# Patient Record
Sex: FEMALE | URBAN - METROPOLITAN AREA
[De-identification: names, ages, dates, MRNs, and addresses within clinical notes are randomized per-mention and may not be internally consistent; named-entity substitution may affect disease eponyms.]

---

## 2024-03-14 ENCOUNTER — OFFICE (OUTPATIENT)
Dept: URBAN - METROPOLITAN AREA CLINIC 115 | Facility: CLINIC | Age: 54
Setting detail: OPHTHALMOLOGY
End: 2024-03-14
Payer: COMMERCIAL

## 2024-03-14 DIAGNOSIS — H10.89: ICD-10-CM

## 2024-03-14 PROCEDURE — 99202 OFFICE O/P NEW SF 15 MIN: CPT | Performed by: OPTOMETRIST

## 2024-04-04 ENCOUNTER — OFFICE (OUTPATIENT)
Dept: URBAN - METROPOLITAN AREA CLINIC 115 | Facility: CLINIC | Age: 54
Setting detail: OPHTHALMOLOGY
End: 2024-04-04
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
## 2024-04-04 PROCEDURE — NO SHOW FE NO SHOW FEE: Performed by: OPTOMETRIST

## 2024-07-19 ENCOUNTER — EMERGENCY (EMERGENCY)
Facility: HOSPITAL | Age: 54
LOS: 1 days | Discharge: HOME | End: 2024-07-19
Attending: STUDENT IN AN ORGANIZED HEALTH CARE EDUCATION/TRAINING PROGRAM
Payer: SELF-PAY

## 2024-07-19 VITALS
TEMPERATURE: 98 F | HEART RATE: 82 BPM | RESPIRATION RATE: 18 BRPM | OXYGEN SATURATION: 99 % | SYSTOLIC BLOOD PRESSURE: 143 MMHG | DIASTOLIC BLOOD PRESSURE: 88 MMHG

## 2024-07-19 VITALS
TEMPERATURE: 97 F | RESPIRATION RATE: 18 BRPM | OXYGEN SATURATION: 98 % | HEART RATE: 92 BPM | DIASTOLIC BLOOD PRESSURE: 110 MMHG | SYSTOLIC BLOOD PRESSURE: 180 MMHG | WEIGHT: 160.06 LBS

## 2024-07-19 PROBLEM — Z00.00 ENCOUNTER FOR PREVENTIVE HEALTH EXAMINATION: Status: ACTIVE | Noted: 2024-07-19

## 2024-07-19 PROCEDURE — 73502 X-RAY EXAM HIP UNI 2-3 VIEWS: CPT | Mod: 26,RT

## 2024-07-19 PROCEDURE — 73080 X-RAY EXAM OF ELBOW: CPT | Mod: 26,RT

## 2024-07-19 PROCEDURE — 73564 X-RAY EXAM KNEE 4 OR MORE: CPT

## 2024-07-19 PROCEDURE — 73030 X-RAY EXAM OF SHOULDER: CPT | Mod: 26,RT

## 2024-07-19 PROCEDURE — 73080 X-RAY EXAM OF ELBOW: CPT

## 2024-07-19 PROCEDURE — 73502 X-RAY EXAM HIP UNI 2-3 VIEWS: CPT

## 2024-07-19 PROCEDURE — 99284 EMERGENCY DEPT VISIT MOD MDM: CPT

## 2024-07-19 PROCEDURE — 73030 X-RAY EXAM OF SHOULDER: CPT

## 2024-07-19 PROCEDURE — 73110 X-RAY EXAM OF WRIST: CPT

## 2024-07-19 PROCEDURE — 73110 X-RAY EXAM OF WRIST: CPT | Mod: 26,RT

## 2024-07-19 PROCEDURE — 73564 X-RAY EXAM KNEE 4 OR MORE: CPT | Mod: 26,RT

## 2024-07-19 RX ORDER — ACETAMINOPHEN 325 MG
650 TABLET ORAL ONCE
Refills: 0 | Status: COMPLETED | OUTPATIENT
Start: 2024-07-19 | End: 2024-07-19

## 2024-07-19 RX ORDER — NAPROXEN SODIUM 550 MG
1 TABLET ORAL
Qty: 14 | Refills: 0
Start: 2024-07-19 | End: 2024-07-25

## 2024-07-19 RX ADMIN — Medication 10 MILLIGRAM(S): at 11:27

## 2024-07-19 RX ADMIN — Medication 650 MILLIGRAM(S): at 11:25

## 2024-07-19 NOTE — ED PROVIDER NOTE - CARE PROVIDER_API CALL
Timothy Fernandez  Orthopaedic Surgery  [address] [place] [id number]  Phone: [phone]  Fax: [phone]  Follow Up Time:
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
## 2024-07-19 NOTE — ED ADULT NURSE NOTE - NSFALLUNIVINTERV_ED_ALL_ED
Addended by: Wellington Coronado on: 3/18/2021 11:36 AM     Modules accepted: Orders Bed/Stretcher in lowest position, wheels locked, appropriate side rails in place/Call bell, personal items and telephone in reach/Instruct patient to call for assistance before getting out of bed/chair/stretcher/Non-slip footwear applied when patient is off stretcher/[place] to call system/Physically safe environment - no spills, clutter or unnecessary equipment/Purposeful proactive rounding/Room/bathroom lighting operational, light cord in reach
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
## 2024-07-19 NOTE — ED PROVIDER NOTE - PATIENT PORTAL LINK FT
You can access the FollowMyHealth Patient Portal offered by [facility] by registering at the following website: [email or web address]. By joining [employer]s FollowMyHealth portal, you will also be able to view your health information using other applications (apps) compatible with our system.
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
## 2024-07-23 ENCOUNTER — APPOINTMENT (OUTPATIENT)
Dept: ORTHOPEDIC SURGERY | Facility: CLINIC | Age: 54
End: 2024-07-23
Payer: COMMERCIAL

## 2024-07-23 VITALS
BODY MASS INDEX: 31.08 KG/M2 | HEIGHT: 71 IN | HEART RATE: 86 BPM | WEIGHT: 222 LBS | DIASTOLIC BLOOD PRESSURE: 84 MMHG | SYSTOLIC BLOOD PRESSURE: 128 MMHG

## 2024-07-23 DIAGNOSIS — S63.501A UNSPECIFIED SPRAIN OF RIGHT WRIST, INITIAL ENCOUNTER: ICD-10-CM

## 2024-07-23 DIAGNOSIS — Z78.9 OTHER SPECIFIED HEALTH STATUS: ICD-10-CM

## 2024-07-23 DIAGNOSIS — Z86.79 PERSONAL HISTORY OF OTHER DISEASES OF THE CIRCULATORY SYSTEM: ICD-10-CM

## 2024-07-23 DIAGNOSIS — Z82.49 FAMILY HISTORY OF ISCHEMIC HEART DISEASE AND OTHER DISEASES OF THE CIRCULATORY SYSTEM: ICD-10-CM

## 2024-07-23 DIAGNOSIS — M47.22 OTHER SPONDYLOSIS WITH RADICULOPATHY, CERVICAL REGION: ICD-10-CM

## 2024-07-23 DIAGNOSIS — Z80.49 FAMILY HISTORY OF MALIGNANT NEOPLASM OF OTHER GENITAL ORGANS: ICD-10-CM

## 2024-07-23 DIAGNOSIS — S13.4XXA SPRAIN OF LIGAMENTS OF CERVICAL SPINE, INITIAL ENCOUNTER: ICD-10-CM

## 2024-07-23 PROCEDURE — 72050 X-RAY EXAM NECK SPINE 4/5VWS: CPT

## 2024-07-23 PROCEDURE — 99204 OFFICE O/P NEW MOD 45 MIN: CPT

## 2024-07-23 RX ORDER — METHYLPREDNISOLONE 4 MG/1
4 TABLET ORAL
Qty: 1 | Refills: 0 | Status: ACTIVE | COMMUNITY
Start: 2024-07-23 | End: 1900-01-01

## 2024-07-23 RX ORDER — LOSARTAN POTASSIUM 50 MG/1
50 TABLET, FILM COATED ORAL
Refills: 0 | Status: ACTIVE | COMMUNITY

## 2024-07-23 RX ORDER — GABAPENTIN 300 MG/1
300 CAPSULE ORAL
Qty: 30 | Refills: 1 | Status: ACTIVE | COMMUNITY
Start: 2024-07-23 | End: 1900-01-01

## 2024-07-23 NOTE — DISCUSSION/SUMMARY
[de-identified] : Medrol Dosepak She will discontinue ibuprofen and restart once the Medrol Dosepak is completed Gabapentin 300 mg at nighttime Weightbearing as tolerated right wrist she will continue to wear her right wrist brace and remove as pain allows She can continue her muscle relaxer Flexeril as needed Physical therapy 2-3 times per week for a 12-week [facility] service referral was placed to assist with scheduling location I will see her back in 4 weeks if her symptoms not improved we will consider more Suresh imaging of her wrist and or cervical spine

## 2024-07-23 NOTE — PHYSICAL EXAM
[de-identified] : CONSTITUTIONAL: Patient is a very pleasant individual who is well-nourished and appears stated age. PSYCHIATRIC: Alert and oriented times three and in no apparent distress, and participates with orthopedic evaluation well. HEAD: Atraumatic and nonsyndromic in appearance. EENT: No thyromegaly, EOMI. RESPIRATORY: Respiratory rate is regular, not dyspneic on examination. LYMPHATICS: There is no cervical or axillary lymphadenopathy. INTEGUMENTARY: Skin is clean, dry, and intact about the bilateral upper extremities and cervical spine. VASCULAR: There is brisk capillary refill about the bilateral upper extremities and radial pulses are 2/4.  Cervical Spine Exam:  There is tenderness along the right-sided paraspinal cervical musculature and right trapezius There is also positive impingement signs right shoulder with abduction external rotation difficult to to obtain full examination given the amount of pain.  Shoulder Abduction (C5): 5/5 LUE, 3/5 RUE 2/2 pain/functional weakness Biceps (C6): 5/5 B/L Wrist Extension (C6): 5/5 LUE, unable to test Right Hand 2/2 wrist pain Triceps (C7): 5/5 B/L Wrist Flexion (C7): 5/5 LUE, unable to test Right Hand 2/2 wrist pain Finger Adduction/Abduction (C8/T1): 5/5 B/L Sensation:  SILT C5-T1 bilateral  Reflexes: 2+ reflexes Bicep/Tricep/Quadriceps/Achilles  Gait: Normal gait w/o assistance Able to perform tandem gait Able to Heel Walk Able to Toe Walk  Special Testing:  Positive Spurlings recreating neck pain  negative clonus BL LE negative Hoffmans B/L UE
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
## 2024-08-02 ENCOUNTER — NON-APPOINTMENT (OUTPATIENT)
Age: 54
End: 2024-08-02

## 2024-08-05 ENCOUNTER — APPOINTMENT (OUTPATIENT)
Dept: ORTHOPEDIC SURGERY | Facility: CLINIC | Age: 54
End: 2024-08-05

## 2024-08-05 PROBLEM — S70.01XA CONTUSION OF RIGHT HIP, INITIAL ENCOUNTER: Status: ACTIVE | Noted: 2024-08-05

## 2024-08-05 PROBLEM — M25.551 PAIN IN RIGHT HIP: Status: ACTIVE | Noted: 2024-08-05

## 2024-08-05 PROBLEM — M25.551 PAIN OF RIGHT HIP JOINT: Status: ACTIVE | Noted: 2024-08-05

## 2024-08-05 PROBLEM — M25.561 RIGHT KNEE PAIN: Status: ACTIVE | Noted: 2024-08-05

## 2024-08-05 PROBLEM — S80.01XA CONTUSION OF RIGHT KNEE, INITIAL ENCOUNTER: Status: ACTIVE | Noted: 2024-08-05

## 2024-08-05 PROCEDURE — 99203 OFFICE O/P NEW LOW 30 MIN: CPT

## 2024-08-05 PROCEDURE — 73562 X-RAY EXAM OF KNEE 3: CPT | Mod: RT

## 2024-08-05 PROCEDURE — 73502 X-RAY EXAM HIP UNI 2-3 VIEWS: CPT | Mod: RT

## 2024-08-05 NOTE — PHYSICAL EXAM
[Normal] : Gait: normal [de-identified] : GENERAL APPEARANCE: Well nourished and hydrated, pleasant, alert, and oriented x 3. Appears their stated age.  HEENT: Normocephalic, extraocular eye motion intact. Nasal septum midline. Oral cavity clear. External auditory canal clear.  RESPIRATORY: Breath sounds clear and audible in all lobes. No wheezing, No accessory muscle use. CARDIOVASCULAR: No apparent abnormalities. No lower leg edema. No varicosities. Pedal pulses are palpable. NEUROLOGIC: Sensation is normal, no muscle weakness in the upper or lower extremities. DERMATOLOGIC: No apparent skin lesions, moist, warm, no rash. SPINE: Cervical spine appears normal and moves freely; thoracic spine appears normal and moves freely; lumbosacral spine appears normal and moves freely, normal, nontender. PSYCHIATRIC: Oriented to person, place, and time, insight and judgement were intact and the affect was normal.  RIGHT hip exam showed iliac crest, TFL and trochanteric bursa tenderness to palpation, no groin pain with SLR,  ROM is 120 flexion with 45 degrees external and 20 degrees internal rotation with no pain,  CORI lateral hip pain, FADIR negative.  No clicking with ROM.     Right knee Examination: Physical examination of the knee demonstrates normal skin without signs of skin changes or abnormalities. No erythema, warmth, or joint effusion is appreciated.   Sensation is intact to light touch L2-S1 Palpable DP/PT pulse EHL/FHL/TA/GSC motor function intact   Range of Motion 0-130 with pain at the terminal degrees of flexion over anterior lateral knee   Strength Testing Quadriceps 5/5 Hamstring 5/5 Patient is able to perform a straight leg raise without difficulty.   Palpation Mildly tender over the anterior lateral tibial plateau and lateral femoral condyle. No palpable defect appreciated in the quadriceps or patellar tendons Not tender to palpation of medial joint line Mildly tender to palpation of lateral joint line Not tenderness at the medial patella facet Not  tender over the patella tendon   Special Tests Anterior Drawer negative Posterior Drawer negative Lachman Exam negative No Varus or Valgus Laxity at 0 or 30 degrees of knee flexion Corina's Test negative today Apley grind test Negative Active compression of the patella negative Lateral apprehension test negative Translation of the patella 2 quadrants with a firm endpoint Dial test negative    [de-identified] : X-rays were obtained today the patient's pelvis, AP view, as well as an AP and lateral view of the right hip.  There is no evidence of acute fracture or dislocation.  Joint space appears well-maintained.  Patient does have evidence of pincer impingement with mild degenerative changes noted.  X-rays were also taken of the patient's right knee, AP, lateral, sunrise views.  No evidence of acute fracture or dislocation.  There is mild arthritic change of the medial and patellofemoral compartment.  There is an enthesophyte at the superior aspect of the patella.
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
## 2024-08-20 ENCOUNTER — APPOINTMENT (OUTPATIENT)
Dept: ORTHOPEDIC SURGERY | Facility: CLINIC | Age: 54
End: 2024-08-20
Payer: COMMERCIAL

## 2024-08-20 VITALS
DIASTOLIC BLOOD PRESSURE: 90 MMHG | HEIGHT: 71 IN | HEART RATE: 72 BPM | SYSTOLIC BLOOD PRESSURE: 160 MMHG | BODY MASS INDEX: 30.52 KG/M2 | WEIGHT: 218 LBS

## 2024-08-20 DIAGNOSIS — M47.22 OTHER SPONDYLOSIS WITH RADICULOPATHY, CERVICAL REGION: ICD-10-CM

## 2024-08-20 DIAGNOSIS — S13.4XXA SPRAIN OF LIGAMENTS OF CERVICAL SPINE, INITIAL ENCOUNTER: ICD-10-CM

## 2024-08-20 PROCEDURE — 99213 OFFICE O/P EST LOW 20 MIN: CPT
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
## 2024-08-26 DIAGNOSIS — M25.531 PAIN IN RIGHT WRIST: ICD-10-CM

## 2024-08-27 ENCOUNTER — APPOINTMENT (OUTPATIENT)
Dept: ORTHOPEDIC SURGERY | Facility: CLINIC | Age: 54
End: 2024-08-27

## 2024-09-18 ENCOUNTER — APPOINTMENT (OUTPATIENT)
Dept: ORTHOPEDIC SURGERY | Facility: CLINIC | Age: 54
End: 2024-09-18
Payer: COMMERCIAL

## 2024-09-18 VITALS — BODY MASS INDEX: 30.52 KG/M2 | HEIGHT: 71 IN | WEIGHT: 218 LBS

## 2024-09-18 DIAGNOSIS — M47.22 OTHER SPONDYLOSIS WITH RADICULOPATHY, CERVICAL REGION: ICD-10-CM

## 2024-09-18 PROCEDURE — 99214 OFFICE O/P EST MOD 30 MIN: CPT

## 2024-09-18 NOTE — DISCUSSION/SUMMARY
[de-identified] : 53-year-old female with C6 radiculopathy and cervical Spondyloarthritis  I discussed with Estrellita that I believe her symptoms rating down her right arm to her right shoulder biceps into her right wrist appear to be in a C6 radicular fashion I believe an MRI of the cervical spine is warranted given her lack of improvement over the past month.  She has had improvements in her symptoms however she is not back to her baseline has been unable to work.  We will move forward with a cervical MRI and I will see her back afterwards to go over the results.  She will follow-up with her primary doctor Maryjo for evaluation of her right wrist for possible right wrist injury
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
## 2024-09-19 ENCOUNTER — APPOINTMENT (OUTPATIENT)
Dept: ORTHOPEDIC SURGERY | Facility: CLINIC | Age: 54
End: 2024-09-19

## 2024-09-19 VITALS
HEIGHT: 71 IN | WEIGHT: 218 LBS | SYSTOLIC BLOOD PRESSURE: 134 MMHG | DIASTOLIC BLOOD PRESSURE: 81 MMHG | BODY MASS INDEX: 30.52 KG/M2

## 2024-09-19 DIAGNOSIS — M25.531 PAIN IN RIGHT WRIST: ICD-10-CM

## 2024-09-19 DIAGNOSIS — G56.00 CARPAL TUNNEL SYNDROME, UNSPECIFIED UPPER LIMB: ICD-10-CM

## 2024-09-19 DIAGNOSIS — M65.9 SYNOVITIS AND TENOSYNOVITIS, UNSPECIFIED: ICD-10-CM

## 2024-09-19 PROCEDURE — 73130 X-RAY EXAM OF HAND: CPT | Mod: 50

## 2024-09-19 PROCEDURE — 20605 DRAIN/INJ JOINT/BURSA W/O US: CPT | Mod: 59,RT

## 2024-09-19 PROCEDURE — 20526 THER INJECTION CARP TUNNEL: CPT | Mod: RT

## 2024-09-19 PROCEDURE — 99215 OFFICE O/P EST HI 40 MIN: CPT | Mod: 25
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
## 2024-09-19 NOTE — PHYSICAL EXAM
[de-identified] : Examination of the [right] wrist reveals discomfort with compression at the level of the volar carpal tunnel eliciting numbness/tingling throughout the fingertips. There is a positive tinel. [There is no obvious thenar atrophy] Elicits numbness along the entirety of the right upper extremity and into the hand dorsally as well   Examination of the [right] wrist reveals tenderness at the level of the radiocarpal joint with very minimal swelling however no signs of infection. [de-identified] : [4] views of [bilateral hands and wrists] were obtained today in my office and were seen by me and discussed with the patient.  These [show findings consistent with bilateral basal joint OA and findings of IP joint OA]

## 2024-09-30 ENCOUNTER — APPOINTMENT (OUTPATIENT)
Dept: ORTHOPEDIC SURGERY | Facility: CLINIC | Age: 54
End: 2024-09-30
Payer: COMMERCIAL

## 2024-09-30 VITALS
HEART RATE: 85 BPM | HEIGHT: 71 IN | DIASTOLIC BLOOD PRESSURE: 89 MMHG | BODY MASS INDEX: 30.52 KG/M2 | WEIGHT: 218 LBS | SYSTOLIC BLOOD PRESSURE: 132 MMHG

## 2024-09-30 DIAGNOSIS — M25.551 PAIN IN RIGHT HIP: ICD-10-CM

## 2024-09-30 DIAGNOSIS — M25.561 PAIN IN RIGHT KNEE: ICD-10-CM

## 2024-09-30 PROCEDURE — 99213 OFFICE O/P EST LOW 20 MIN: CPT
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
## 2024-09-30 NOTE — PHYSICAL EXAM
[Normal] : Gait: normal [de-identified] : GENERAL APPEARANCE: Well nourished and hydrated, pleasant, alert, and oriented x 3. Appears their stated age.  HEENT: Normocephalic, extraocular eye motion intact. Nasal septum midline. Oral cavity clear. External auditory canal clear.  RESPIRATORY: Breath sounds clear and audible in all lobes. No wheezing, No accessory muscle use. CARDIOVASCULAR: No apparent abnormalities. No lower leg edema. No varicosities. Pedal pulses are palpable. NEUROLOGIC: Sensation is normal, no muscle weakness in the upper or lower extremities. DERMATOLOGIC: No apparent skin lesions, moist, warm, no rash. SPINE: Cervical spine appears normal and moves freely; thoracic spine appears normal and moves freely; lumbosacral spine appears normal and moves freely, normal, nontender. PSYCHIATRIC: Oriented to person, place, and time, insight and judgement were intact and the affect was normal.  RIGHT hip exam showed iliac crest, TFL and trochanteric bursa tenderness to palpation, no groin pain with SLR,  ROM is 120 flexion with 45 degrees external and 20 degrees internal rotation with no pain,  CORI lateral hip pain, FADIR negative.  No clicking with ROM.     Right knee Examination: Physical examination of the knee demonstrates normal skin without signs of skin changes or abnormalities. No erythema, warmth, or joint effusion is appreciated.  Small superficial abrasion over the inferior aspect of the patella with no evidence of infection.   Sensation is intact to light touch L2-S1 Palpable DP/PT pulse EHL/FHL/TA/GSC motor function intact   Range of Motion 0-130 with pain at the terminal degrees of flexion over anterior lateral knee   Strength Testing Quadriceps 5/5 Hamstring 5/5 Patient is able to perform a straight leg raise without difficulty.   Palpation Mildly tender over the anterior lateral tibial plateau and lateral femoral condyle. No palpable defect appreciated in the quadriceps or patellar tendons Mildly tender to palpation of medial joint line Mildly tender to palpation of lateral joint line Not tenderness at the medial patella facet Not  tender over the patella tendon   Special Tests Anterior Drawer negative Posterior Drawer negative Lachman Exam negative No Varus or Valgus Laxity at 0 or 30 degrees of knee flexion Corina's Test negative today Apley grind test Negative Active compression of the patella negative Lateral apprehension test negative Translation of the patella 2 quadrants with a firm endpoint Dial test negative  Right hip Examination: Physical examination of the hip demonstrates normal skin without signs of skin changes or abnormalities. No erythema, warmth, or joint effusion is appreciated.   Sensation is intact to light touch L2-S1 Palpable DP/PT pulse EHL/FHL/TA/GSC motor function intact   Range of Motion 130 degrees of flexion to full extension 30 degrees of internal rotation 70 degrees of external rotation 45 degrees of hip abduction 20 degrees of hip adduction   Strength Testing Hip Flexors/Hip Extensors 5/5 Hip Abductors/Hip Adductors 5/5 Quadriceps/Hamstring 5/5 Patient is able to perform a straight leg raise without difficulty.   Palpation Moderately tender to palpation about the greater trochanter Not tender to palpation about the hip joint Not tender to palpation about the pubic symphysis Not tender to palpation about the rectus and conjoint tendon insertions Not tender to palpation about the adductor longus tendon origin   Special Tests CORI test negative FADIR test negative Sanchez's test negative Ankur test negative Resisted sit-ups negative Pain with valsalva negative Straight leg raise test negative     [de-identified] : X-rays were obtained today the patient's pelvis, AP view, as well as an AP and lateral view of the right hip.  There is no evidence of acute fracture or dislocation.  Joint space appears well-maintained.  Patient does have evidence of pincer impingement with mild degenerative changes noted.  X-rays were also taken of the patient's right knee, AP, lateral, sunrise views.  No evidence of acute fracture or dislocation.  There is mild arthritic change of the medial and patellofemoral compartment.  There is an enthesophyte at the superior aspect of the patella.

## 2024-10-02 ENCOUNTER — APPOINTMENT (OUTPATIENT)
Dept: MRI IMAGING | Facility: CLINIC | Age: 54
End: 2024-10-02

## 2024-10-21 ENCOUNTER — APPOINTMENT (OUTPATIENT)
Dept: MRI IMAGING | Facility: CLINIC | Age: 54
End: 2024-10-21

## 2024-10-21 ENCOUNTER — APPOINTMENT (OUTPATIENT)
Dept: MRI IMAGING | Facility: CLINIC | Age: 54
End: 2024-10-21
Payer: COMMERCIAL

## 2024-10-21 ENCOUNTER — OUTPATIENT (OUTPATIENT)
Dept: OUTPATIENT SERVICES | Facility: HOSPITAL | Age: 54
LOS: 1 days | End: 2024-10-21
Payer: COMMERCIAL

## 2024-10-21 DIAGNOSIS — Z00.8 ENCOUNTER FOR OTHER GENERAL EXAMINATION: ICD-10-CM

## 2024-10-21 PROCEDURE — 73721 MRI JNT OF LWR EXTRE W/O DYE: CPT

## 2024-10-21 PROCEDURE — 72141 MRI NECK SPINE W/O DYE: CPT

## 2024-10-21 PROCEDURE — 72141 MRI NECK SPINE W/O DYE: CPT | Mod: 26

## 2024-10-21 PROCEDURE — 73721 MRI JNT OF LWR EXTRE W/O DYE: CPT | Mod: 26,RT

## 2024-10-22 ENCOUNTER — APPOINTMENT (OUTPATIENT)
Dept: ORTHOPEDIC SURGERY | Facility: CLINIC | Age: 54
End: 2024-10-22
Payer: COMMERCIAL

## 2024-10-22 VITALS
DIASTOLIC BLOOD PRESSURE: 84 MMHG | BODY MASS INDEX: 30.52 KG/M2 | HEIGHT: 71 IN | WEIGHT: 218 LBS | SYSTOLIC BLOOD PRESSURE: 124 MMHG | HEART RATE: 82 BPM

## 2024-10-22 DIAGNOSIS — M47.22 OTHER SPONDYLOSIS WITH RADICULOPATHY, CERVICAL REGION: ICD-10-CM

## 2024-10-22 PROCEDURE — 99214 OFFICE O/P EST MOD 30 MIN: CPT

## 2024-10-22 NOTE — PHYSICAL EXAM
[de-identified] : MRI of the cervical spine performed in [facility] on 10/21/2024 axial sagittal T1-T2 activity images demonstrates mild reversal of the cervical lordosis at C4-C5 there is small disc protrusion no significant foraminal narrowing At C5-6 there is broad-based disc bulging facet arthropathy contributing to right, severe right foraminal stenosis moderate left foraminal stenosis At C6-C7 C7-T1 no significant pathology

## 2024-10-22 NOTE — DISCUSSION/SUMMARY
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  I discussed with Ting that I believe her right upper extremity symptoms are related to her C6 nerve root compression secondary to that severe foraminal stenosis at C5-C6 She has failed to respond to conservative measures she getting consistent and persistent pain rating down the right arm is affecting her quality of life I believe the next best that is to move forward with a diagnostic and therapeutic right C6 selective nerve root sleeve injection this will allow us insight as to if her wrist pain is also coming from the C6 radiculopathy or if this is separate pathology Referral will be placed to [facility] for a right C6 selective nerve root sleeve injection I will see her afterward to see how she is doing and discuss further options
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
## 2024-11-04 ENCOUNTER — APPOINTMENT (OUTPATIENT)
Dept: ORTHOPEDIC SURGERY | Facility: CLINIC | Age: 54
End: 2024-11-04
Payer: COMMERCIAL

## 2024-11-04 VITALS
HEIGHT: 71 IN | BODY MASS INDEX: 30.52 KG/M2 | WEIGHT: 218 LBS | HEART RATE: 79 BPM | DIASTOLIC BLOOD PRESSURE: 105 MMHG | SYSTOLIC BLOOD PRESSURE: 175 MMHG

## 2024-11-04 DIAGNOSIS — M25.551 PAIN IN RIGHT HIP: ICD-10-CM

## 2024-11-04 DIAGNOSIS — M25.561 PAIN IN RIGHT KNEE: ICD-10-CM

## 2024-11-04 PROCEDURE — 99213 OFFICE O/P EST LOW 20 MIN: CPT

## 2024-11-04 NOTE — PHYSICAL EXAM
[Normal] : Gait: normal [de-identified] : GENERAL APPEARANCE: Well nourished and hydrated, pleasant, alert, and oriented x 3. Appears their stated age.  HEENT: Normocephalic, extraocular eye motion intact. Nasal septum midline. Oral cavity clear. External auditory canal clear.  RESPIRATORY: Breath sounds clear and audible in all lobes. No wheezing, No accessory muscle use. CARDIOVASCULAR: No apparent abnormalities. No lower leg edema. No varicosities. Pedal pulses are palpable. NEUROLOGIC: Sensation is normal, no muscle weakness in the upper or lower extremities. DERMATOLOGIC: No apparent skin lesions, moist, warm, no rash. SPINE: Cervical spine appears normal and moves freely; thoracic spine appears normal and moves freely; lumbosacral spine appears normal and moves freely, normal, nontender. PSYCHIATRIC: Oriented to person, place, and time, insight and judgement were intact and the affect was normal.  RIGHT hip exam showed iliac crest, TFL and trochanteric bursa tenderness to palpation, no groin pain with SLR,  ROM is 120 flexion with 45 degrees external and 20 degrees internal rotation with no pain,  JEFRY lateral hip pain, FADIR negative.  No clicking with ROM.     Right knee Examination: Physical examination of the knee demonstrates normal skin without signs of skin changes or abnormalities. No erythema, warmth, or joint effusion is appreciated.  Small superficial abrasion over the inferior aspect of the patella with no evidence of infection.   Sensation is intact to light touch L2-S1 Palpable DP/PT pulse EHL/FHL/TA/GSC motor function intact   Range of Motion 0-130 with pain at the terminal degrees of flexion over anterior lateral knee   Strength Testing Quadriceps 5/5 Hamstring 5/5 Patient is able to perform a straight leg raise without difficulty.   Palpation Mildly tender over the anterior lateral tibial plateau and lateral femoral condyle. No palpable defect appreciated in the quadriceps or patellar tendons Mildly tender to palpation of medial joint line Mildly tender to palpation of lateral joint line Not tenderness at the medial patella facet Not  tender over the patella tendon   Special Tests Anterior Drawer negative Posterior Drawer negative Lachman Exam negative No Varus or Valgus Laxity at 0 or 30 degrees of knee flexion Susan's Test negative today Apley grind test Negative Active compression of the patella negative Lateral apprehension test negative Translation of the patella 2 quadrants with a firm endpoint Dial test negative  Right hip Examination: Physical examination of the hip demonstrates normal skin without signs of skin changes or abnormalities. No erythema, warmth, or joint effusion is appreciated.   Sensation is intact to light touch L2-S1 Palpable DP/PT pulse EHL/FHL/TA/GSC motor function intact   Range of Motion 130 degrees of flexion to full extension 30 degrees of internal rotation 70 degrees of external rotation 45 degrees of hip abduction 20 degrees of hip adduction   Strength Testing Hip Flexors/Hip Extensors 5/5 Hip Abductors/Hip Adductors 5/5 Quadriceps/Hamstring 5/5 Patient is able to perform a straight leg raise without difficulty.   Palpation Moderately tender to palpation about the greater trochanter Not tender to palpation about the hip joint Not tender to palpation about the pubic symphysis Not tender to palpation about the rectus and conjoint tendon insertions Not tender to palpation about the adductor longus tendon origin   Special Tests JEFRY test negative FADIR test negative Gant's test negative Tania test negative Resisted sit-ups negative Pain with valsalva negative Straight leg raise test negative     [de-identified] : MRI of the right hip from a [facility] on 10/21/2024 was reviewed with the patient today in the office: -demonstrates minimal trochanteric bursitis. Degenerative fraying of the anterior labrum without displaced tear.  MRI of the right knee from a [facility] on 10/21/2024 was reviewed the patient today in the office: -MRI of the right knee demonstrates edema at the anterior lateral tibial plateau near the insertion of the iliotibial band. Findings represent contusion versus sequela of iliotibial band syndrome. There is no fracture. -Low-grade sprain of the anterior cruciate ligament, without tear.  X-rays were obtained today the patient's pelvis, AP view, as well as an AP and lateral view of the right hip.  There is no evidence of acute fracture or dislocation.  Joint space appears well-maintained.  Patient does have evidence of pincer impingement with mild degenerative changes noted.  X-rays were also taken of the patient's right knee, AP, lateral, sunrise views.  No evidence of acute fracture or dislocation.  There is mild arthritic change of the medial and patellofemoral compartment.  There is an enthesophyte at the superior aspect of the patella.
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
## 2024-11-20 ENCOUNTER — APPOINTMENT (OUTPATIENT)
Dept: ORTHOPEDIC SURGERY | Facility: CLINIC | Age: 54
End: 2024-11-20
Payer: COMMERCIAL

## 2024-11-20 DIAGNOSIS — G56.00 CARPAL TUNNEL SYNDROME, UNSPECIFIED UPPER LIMB: ICD-10-CM

## 2024-11-20 DIAGNOSIS — M65.90 UNSPECIFIED SYNOVITIS AND TENOSYNOVITIS, UNSPECIFIED SITE: ICD-10-CM

## 2024-11-20 DIAGNOSIS — M25.531 PAIN IN RIGHT WRIST: ICD-10-CM

## 2024-11-20 PROCEDURE — 20605 DRAIN/INJ JOINT/BURSA W/O US: CPT | Mod: 59,RT

## 2024-11-20 PROCEDURE — 20526 THER INJECTION CARP TUNNEL: CPT | Mod: RT

## 2024-11-20 PROCEDURE — 99214 OFFICE O/P EST MOD 30 MIN: CPT | Mod: 25
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
## 2024-11-20 NOTE — PHYSICAL EXAM
[de-identified] : Examination of the [right] wrist reveals discomfort with compression at the level of the volar carpal tunnel eliciting numbness/tingling throughout the fingertips. There is a positive tinel. [There is no obvious thenar atrophy] Elicits numbness along the entirety of the right upper extremity and into the hand dorsally as well   Examination of the [right] wrist reveals improving tenderness at the level of the radiocarpal joint with very minimal swelling however no signs of infection. [de-identified] : [4] views of [bilateral hands and wrists] were reviewed today in my office and were seen by me and discussed with the patient.  These [show findings consistent with bilateral basal joint OA and findings of IP joint OA]

## 2024-12-06 ENCOUNTER — APPOINTMENT (OUTPATIENT)
Dept: ORTHOPEDIC SURGERY | Facility: CLINIC | Age: 54
End: 2024-12-06
Payer: COMMERCIAL

## 2024-12-06 VITALS — HEIGHT: 71 IN | BODY MASS INDEX: 30.52 KG/M2 | WEIGHT: 218 LBS

## 2024-12-06 DIAGNOSIS — M47.22 OTHER SPONDYLOSIS WITH RADICULOPATHY, CERVICAL REGION: ICD-10-CM

## 2024-12-06 PROCEDURE — 99213 OFFICE O/P EST LOW 20 MIN: CPT

## 2024-12-06 NOTE — DISCUSSION/SUMMARY
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

## 2024-12-06 NOTE — HISTORY OF PRESENT ILLNESS
[de-identified] : Chief Complaint: Neck and right arm pain   History of Present Illness: 54-year-old female who has been suffering from a right C6 radiculopathy.  She recently underwent a right C6 selective nerve root sleeve injection which completely recreated as well as alleviate her symptoms she states that the pain was 100% improved and the pain is slowly started to recur after about 1 week.  She still having some sustained relief from the injection she is happy with her current level of pain control but she states that her neck and right arm are not back to her baseline to the point where she can return back to work given the work she needs to perform.  She was seen by Dr. Puga her hand specialist who is managing her hand symptoms.  Currently she still not back to work and she would like to consider other options for better pain management for her neck and arm   Past medical history, past surgical history, medications, allergies, social history, and family history are as documented in our records today.  Notable items include: None   Review of Systems: I have reviewed the patient's documented Review of Systems data today, I concur with this documentation.

## 2024-12-11 ENCOUNTER — APPOINTMENT (OUTPATIENT)
Dept: ORTHOPEDIC SURGERY | Facility: CLINIC | Age: 54
End: 2024-12-11

## 2024-12-16 ENCOUNTER — APPOINTMENT (OUTPATIENT)
Dept: ORTHOPEDIC SURGERY | Facility: CLINIC | Age: 54
End: 2024-12-16
Payer: COMMERCIAL

## 2024-12-16 VITALS
HEIGHT: 71 IN | DIASTOLIC BLOOD PRESSURE: 85 MMHG | SYSTOLIC BLOOD PRESSURE: 126 MMHG | HEART RATE: 74 BPM | WEIGHT: 218 LBS | BODY MASS INDEX: 30.52 KG/M2

## 2024-12-16 DIAGNOSIS — S80.01XA CONTUSION OF RIGHT KNEE, INITIAL ENCOUNTER: ICD-10-CM

## 2024-12-16 PROCEDURE — 20610 DRAIN/INJ JOINT/BURSA W/O US: CPT | Mod: RT

## 2024-12-16 PROCEDURE — 99214 OFFICE O/P EST MOD 30 MIN: CPT | Mod: 25

## 2024-12-16 NOTE — PHYSICAL EXAM
[Normal] : Gait: normal [de-identified] : General: Awake, alert, no acute distress, Patient was cooperative and appropriate during the examination.  The patient is of normal weight for height and age.  Walks without an antalgic gait.   Right knee Examination: Physical examination of the knee demonstrates normal skin without signs of skin changes or abnormalities. No erythema, warmth, or joint effusion is appreciated.   Sensation is intact to light touch L2-S1 Palpable DP/PT pulse EHL/FHL/TA/GSC motor function intact   Range of Motion 0-130 with pain at the terminal degrees of flexion over anterior lateral knee   Strength Testing Quadriceps 5/5 Hamstring 5/5 Patient is able to perform a straight leg raise without difficulty.   Palpation Mildly tender over the anterior lateral tibial plateau and lateral femoral condyle. No palpable defect appreciated in the quadriceps or patellar tendons Not tender to palpation of medial joint line Mildly tender to palpation of lateral joint line Not tenderness at the medial patella facet Mildly tender palpation over the patellofemoral compartment Not  tender over the patella tendon   Special Tests Anterior Drawer negative Posterior Drawer negative Lachman Exam negative No Varus or Valgus Laxity at 0 or 30 degrees of knee flexion Susan's Test negative today Apley grind test Negative Active compression of the patella negative Lateral apprehension test negative Translation of the patella 2 quadrants with a firm endpoint Dial test negative  Right hip Examination: Physical examination of the hip demonstrates normal skin without signs of skin changes or abnormalities. No erythema, warmth, or joint effusion is appreciated.   Sensation is intact to light touch L2-S1 Palpable DP/PT pulse EHL/FHL/TA/GSC motor function intact   Range of Motion 130 degrees of flexion to full extension 30 degrees of internal rotation 70 degrees of external rotation 45 degrees of hip abduction 20 degrees of hip adduction   Strength Testing Hip Flexors/Hip Extensors 5/5 Hip Abductors/Hip Adductors 5/5 Quadriceps/Hamstring 5/5 Patient is able to perform a straight leg raise without difficulty.   Palpation Minimally tender to palpation about the greater trochanter Not tender to palpation about the hip joint Not tender to palpation about the pubic symphysis Not tender to palpation about the rectus and conjoint tendon insertions Not tender to palpation about the adductor longus tendon origin   Special Tests JEFRY test negative FADIR test negative Gant's test negative Tania test negative Resisted sit-ups negative Pain with valsalva negative Straight leg raise test negative     [de-identified] : MRI of the right hip from a [facility] on 10/21/2024 was reviewed with the patient today in the office: -demonstrates minimal trochanteric bursitis. Degenerative fraying of the anterior labrum without displaced tear.  MRI of the right knee from a [facility] on 10/21/2024 was reviewed the patient today in the office: -MRI of the right knee demonstrates edema at the anterior lateral tibial plateau near the insertion of the iliotibial band. Findings represent contusion versus sequela of iliotibial band syndrome. There is no fracture. -Low-grade sprain of the anterior cruciate ligament, without tear.  X-rays were obtained today the patient's pelvis, AP view, as well as an AP and lateral view of the right hip.  There is no evidence of acute fracture or dislocation.  Joint space appears well-maintained.  Patient does have evidence of pincer impingement with mild degenerative changes noted.  X-rays were also taken of the patient's right knee, AP, lateral, sunrise views.  No evidence of acute fracture or dislocation.  There is mild arthritic change of the medial and patellofemoral compartment.  There is an enthesophyte at the superior aspect of the patella.

## 2024-12-16 NOTE — PROCEDURE
[de-identified] : I injected the patient's right knee today with cortisone.   I discussed at length with the patient the planned steroid and lidocaine injection. The risks, benefits, convalescence and alternatives were reviewed. The possible side effects discussed included but were not limited to: pain, swelling, heat, bleeding, and redness. Symptoms are generally mild but if they are extensive then contact the office. Giving pain relievers by mouth such as NSAIDs or Tylenol can generally treat the reactions to steroid and lidocaine. Rare cases of infection have been noted. Rash, hives and itching may occur post injection. If you have muscle pain or cramps, flushing and or swelling of the face, rapid heart beat, nausea, dizziness, fever, chills, headache, difficulty breathing, swelling in the arms or legs, or have a prickly feeling of your skin, contact a health care provider immediately. Following this discussion, the knee was prepped with Chlorhexidine and Alcohol and under sterile conditions the 80 mg Depo-Medrol and 4 cc Lidocaine injection was performed with a 22 gauge needle through a anterolateral injection site. The needle was introduced into the joint, aspiration was performed to ensure intra-articular placement and the medication was injected. Upon withdrawal of the needle the site was cleaned with alcohol and a band aid applied. The patient tolerated the injection well and there were no adverse effects. Post injection instructions included no strenuous activity for 24 hours, cryotherapy and if there are any adverse effects to contact the office.
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
## 2024-12-17 ENCOUNTER — APPOINTMENT (OUTPATIENT)
Dept: ORTHOPEDIC SURGERY | Facility: CLINIC | Age: 54
End: 2024-12-17

## 2025-01-07 ENCOUNTER — APPOINTMENT (OUTPATIENT)
Dept: ORTHOPEDIC SURGERY | Facility: CLINIC | Age: 55
End: 2025-01-07
Payer: COMMERCIAL

## 2025-01-07 VITALS
BODY MASS INDEX: 30.52 KG/M2 | DIASTOLIC BLOOD PRESSURE: 92 MMHG | HEART RATE: 78 BPM | WEIGHT: 218 LBS | SYSTOLIC BLOOD PRESSURE: 128 MMHG | HEIGHT: 71 IN

## 2025-01-07 DIAGNOSIS — M47.22 OTHER SPONDYLOSIS WITH RADICULOPATHY, CERVICAL REGION: ICD-10-CM

## 2025-01-07 PROCEDURE — 99213 OFFICE O/P EST LOW 20 MIN: CPT
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
## 2025-01-07 NOTE — ASSESSMENT
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  She is having some weakness in the right upper extremity especially with lifting greater than 5 pounds.  She is scheduled to undergo a cervical epidural steroid injection.  I believe this can help her right upper extremity symptoms.  She has been diagnosed with a right C6 radiculopathy and she has been relatively stagnant to fully improved.  Her symptoms and pain are about 50% better which she is happy with but she still has significant limitations to her baseline I will see her back after the epidural to discuss further options including continued conservative measures versus surgical invention A referral be placed to a hand specialist she would like a second opinion
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
## 2025-01-14 ENCOUNTER — NON-APPOINTMENT (OUTPATIENT)
Age: 55
End: 2025-01-14

## 2025-02-04 ENCOUNTER — APPOINTMENT (OUTPATIENT)
Dept: ORTHOPEDIC SURGERY | Facility: CLINIC | Age: 55
End: 2025-02-04
Payer: COMMERCIAL

## 2025-02-04 VITALS — WEIGHT: 215 LBS | HEIGHT: 71 IN | BODY MASS INDEX: 30.1 KG/M2

## 2025-02-04 DIAGNOSIS — M47.22 OTHER SPONDYLOSIS WITH RADICULOPATHY, CERVICAL REGION: ICD-10-CM

## 2025-02-04 PROCEDURE — 99213 OFFICE O/P EST LOW 20 MIN: CPT

## 2025-02-04 NOTE — DISCUSSION/SUMMARY
[de-identified] : 54-year-old female with cervical Spondyloarthritis and right greater than left C6 radiculopathy  She had a good response to the most recent injection and her radicular symptoms have significant improved her neck pain is also improved I believe we can continue conservative measures a new prescription was provided to focus on the left shoulder and left upper extremity as well as she does have moderate stenosis and compression of the left C6 nerve root I would like to see her back in 4 to 6 weeks for repeat evaluation if her symptoms are improving we can discuss returning back to work otherwise we will keep her out of work for the next 4 weeks and see how she responds to this injection and new physical therapy treatment

## 2025-02-04 NOTE — HISTORY OF PRESENT ILLNESS
[de-identified] : Chief Complaint: Neck and right arm pain   History of Present Illness: Sp is a 54-year-old female that has cervical Spondyloarthritis and right C6 radiculopathy.  She has been improving with injections and conservative measures including physical therapy.  She had a recent epidural steroid injection which had significant proved her neck and right arm symptoms.  She states that she still gets mild issues in the right wrist with range of motion and activity but the neck and radiating right arm pain has improved she did state that she started to get some new symptoms rating down her left arm and shoulder but this is only over the past 1 to 2 weeks.  She is happy with her current level of pain control she is happy with the response to the most recent injection feels that things are heading in the right direction.   Past medical history, past surgical history, medications, allergies, social history, and family history are as documented in our records today.  Notable items include: None   Review of Systems: I have reviewed the patient's documented Review of Systems data today, I concur with this documentation.

## 2025-02-19 ENCOUNTER — APPOINTMENT (OUTPATIENT)
Dept: ORTHOPEDIC SURGERY | Facility: CLINIC | Age: 55
End: 2025-02-19
Payer: COMMERCIAL

## 2025-02-19 VITALS
SYSTOLIC BLOOD PRESSURE: 151 MMHG | WEIGHT: 215 LBS | HEART RATE: 88 BPM | BODY MASS INDEX: 30.1 KG/M2 | HEIGHT: 71 IN | DIASTOLIC BLOOD PRESSURE: 105 MMHG

## 2025-02-19 DIAGNOSIS — M25.551 PAIN IN RIGHT HIP: ICD-10-CM

## 2025-02-19 DIAGNOSIS — M25.561 PAIN IN RIGHT KNEE: ICD-10-CM

## 2025-02-19 PROCEDURE — 99213 OFFICE O/P EST LOW 20 MIN: CPT

## 2025-02-19 RX ORDER — IBUPROFEN 800 MG/1
800 TABLET, FILM COATED ORAL 3 TIMES DAILY
Qty: 42 | Refills: 0 | Status: ACTIVE | COMMUNITY
Start: 2025-02-19 | End: 1900-01-01
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
## 2025-02-19 NOTE — PHYSICAL EXAM
[Normal] : Gait: normal [de-identified] : General: Awake, alert, no acute distress, Patient was cooperative and appropriate during the examination.  The patient is of normal weight for height and age.  Walks without an antalgic gait.   Right knee Examination: Physical examination of the knee demonstrates normal skin without signs of skin changes or abnormalities. No erythema, warmth, or joint effusion is appreciated.   Sensation is intact to light touch L2-S1 Palpable DP/PT pulse EHL/FHL/TA/GSC motor function intact   Range of Motion 0-130 with pain at the terminal degrees of flexion over anterior lateral knee   Strength Testing Quadriceps 5/5 Hamstring 5/5 Patient is able to perform a straight leg raise without difficulty.   Palpation Not tender over the anterior lateral tibial plateau and lateral femoral condyle. No palpable defect appreciated in the quadriceps or patellar tendons Not tender to palpation of medial joint line Mildly tender to palpation of lateral joint line Not tenderness at the medial patella facet Mildly tender palpation over the patellofemoral compartment Not  tender over the patella tendon   Special Tests Anterior Drawer negative Posterior Drawer negative Lachman Exam negative No Varus or Valgus Laxity at 0 or 30 degrees of knee flexion Susan's Test negative today Apley grind test Negative Active compression of the patella negative Lateral apprehension test negative Translation of the patella 2 quadrants with a firm endpoint Dial test negative  Right hip Examination: Physical examination of the hip demonstrates normal skin without signs of skin changes or abnormalities. No erythema, warmth, or joint effusion is appreciated.   Sensation is intact to light touch L2-S1 Palpable DP/PT pulse EHL/FHL/TA/GSC motor function intact   Range of Motion 130 degrees of flexion to full extension 30 degrees of internal rotation 70 degrees of external rotation 45 degrees of hip abduction 20 degrees of hip adduction   Strength Testing Hip Flexors/Hip Extensors 5/5 Hip Abductors/Hip Adductors 5/5 Quadriceps/Hamstring 5/5 Patient is able to perform a straight leg raise without difficulty.   Palpation Mildly tender to palpation about the greater trochanter Not tender to palpation about the hip joint Not tender to palpation about the pubic symphysis Not tender to palpation about the rectus and conjoint tendon insertions Not tender to palpation about the adductor longus tendon origin   Special Tests JEFRY test negative FADIR test negative Gant's test negative Tania test negative Resisted sit-ups negative Pain with valsalva negative Straight leg raise test negative     [de-identified] : MRI of the right hip from a [facility] on 10/21/2024 was reviewed with the patient today in the office: -demonstrates minimal trochanteric bursitis. Degenerative fraying of the anterior labrum without displaced tear.  MRI of the right knee from a [facility] on 10/21/2024 was reviewed the patient today in the office: -MRI of the right knee demonstrates edema at the anterior lateral tibial plateau near the insertion of the iliotibial band. Findings represent contusion versus sequela of iliotibial band syndrome. There is no fracture. -Low-grade sprain of the anterior cruciate ligament, without tear.  X-rays were obtained today the patient's pelvis, AP view, as well as an AP and lateral view of the right hip.  There is no evidence of acute fracture or dislocation.  Joint space appears well-maintained.  Patient does have evidence of pincer impingement with mild degenerative changes noted.  X-rays were also taken of the patient's right knee, AP, lateral, sunrise views.  No evidence of acute fracture or dislocation.  There is mild arthritic change of the medial and patellofemoral compartment.  There is an enthesophyte at the superior aspect of the patella.
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
## 2025-03-04 ENCOUNTER — APPOINTMENT (OUTPATIENT)
Dept: ORTHOPEDIC SURGERY | Facility: CLINIC | Age: 55
End: 2025-03-04
Payer: COMMERCIAL

## 2025-03-04 VITALS
HEIGHT: 71 IN | HEART RATE: 80 BPM | SYSTOLIC BLOOD PRESSURE: 150 MMHG | BODY MASS INDEX: 30.1 KG/M2 | WEIGHT: 215 LBS | DIASTOLIC BLOOD PRESSURE: 88 MMHG

## 2025-03-04 DIAGNOSIS — M47.22 OTHER SPONDYLOSIS WITH RADICULOPATHY, CERVICAL REGION: ICD-10-CM

## 2025-03-04 PROCEDURE — 99213 OFFICE O/P EST LOW 20 MIN: CPT
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
## 2025-03-04 NOTE — ASSESSMENT
[FreeTextEntry1] : 54-year-old female with bilateral C6 radiculopathy  Sp is overall stable and her symptoms she still having some residual symptoms in her right arm her left arm is beginning to become more more symptomatic rating down the left arm in a radicular fashion.  She has severe bilateral foraminal stenosis at C5-C6 her symptoms have improved since the start of treatment however she still having some residual symptoms rating from return back to her normal activities She is scheduled to undergo a cervical epidural steroid injection after this will help her current symptoms as well as her bilateral arm symptoms I will see her back in 2 weeks afterwards to see how she is doing and discuss continuing conservative measures versus discussion of possible surgical invention

## 2025-03-04 NOTE — HISTORY OF PRESENT ILLNESS
[de-identified] : CC: Neck and bilateral arm pain  HPI: 54-year-old female presenting today for 1 month follow-up visit.  Ting has been dealing with C6 to colopathy worse in the right upper extremity she is also been dealing with shoulder wrist and hand issues in her right arm.  She responded extremely well to a C6 SNRI.  However her symptoms did not begin to radiate down her left arm they are more episodic they are not as severe as her right side right side is also improved overall she is about 50 to 60% better she is currently recovering from pneumonia show she has been unable to obtain her epidural steroid injection overall her symptoms are manageable but she would like to see how she responds to the upcoming epidural injection in the cervical spine to see if she would like to pursue surgical invention versus continuing conservative measures.
no

## 2025-04-01 ENCOUNTER — APPOINTMENT (OUTPATIENT)
Dept: ORTHOPEDIC SURGERY | Facility: CLINIC | Age: 55
End: 2025-04-01
Payer: COMMERCIAL

## 2025-04-01 VITALS
BODY MASS INDEX: 30.1 KG/M2 | SYSTOLIC BLOOD PRESSURE: 128 MMHG | DIASTOLIC BLOOD PRESSURE: 88 MMHG | HEART RATE: 70 BPM | WEIGHT: 215 LBS | HEIGHT: 71 IN

## 2025-04-01 DIAGNOSIS — M47.22 OTHER SPONDYLOSIS WITH RADICULOPATHY, CERVICAL REGION: ICD-10-CM

## 2025-04-01 PROCEDURE — 99213 OFFICE O/P EST LOW 20 MIN: CPT

## 2025-04-01 NOTE — ASSESSMENT
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

## 2025-04-01 NOTE — HISTORY OF PRESENT ILLNESS
[de-identified] : cc: Bilateral arm pain  hpi: 54-year-old female presenting today for follow-up after her cervical epidural steroid injection.  Ting has been dealing with bilateral C6 radiculopathy her right-sided symptoms have significant improved she underwent a cervical epidural steroid injection about 1 week ago she states that she still having inflammation and pain down her left arm C6 distribution.  No symptoms improved after her last cervical injection for her right side so she still waiting to see if she gets improvement.  Overall her right-sided symptoms she is very happy with that she still has some minor discomfort in her biceps mainly with use of her right arm but she is able to do overhead activities which she was not able to do before.  She is scheduled to restart physical therapy as she missed several weeks because of pneumonia.

## 2025-04-21 ENCOUNTER — APPOINTMENT (OUTPATIENT)
Dept: ORTHOPEDIC SURGERY | Facility: CLINIC | Age: 55
End: 2025-04-21

## 2025-05-01 ENCOUNTER — APPOINTMENT (OUTPATIENT)
Dept: ORTHOPEDIC SURGERY | Facility: CLINIC | Age: 55
End: 2025-05-01
Payer: COMMERCIAL

## 2025-05-01 VITALS — WEIGHT: 215 LBS | HEIGHT: 71 IN | BODY MASS INDEX: 30.1 KG/M2

## 2025-05-01 DIAGNOSIS — M47.22 OTHER SPONDYLOSIS WITH RADICULOPATHY, CERVICAL REGION: ICD-10-CM

## 2025-05-01 PROCEDURE — 99213 OFFICE O/P EST LOW 20 MIN: CPT

## 2025-05-01 NOTE — DISCUSSION/SUMMARY
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  I discussed with Sp that her right-sided symptoms in her biceps can be related to the C6 radiculopathy possible shoulder pathology however her symptoms are minimal and should possibly can improve with physical therapy Her left arm symptoms are more severe she has severe pain with reaching overhead activities we will monitor this over the next several weeks I would also recommend a repeat epidural steroid injection to see if we can resolve her symptoms in the left upper extremity I will reach out to [facility] for evaluation I will see her back in 4 weeks to see how the epidural and injection worked and then her current symptoms we will discuss further options at that time.

## 2025-05-01 NOTE — HISTORY OF PRESENT ILLNESS
[de-identified] : cc: Neck and left arm pain  hpi: 54-year-old female presenting today for 3-week follow-up visit.  Ting has been doing with neck bilateral C6 radiculopathy symptoms her right upper extremity symptoms have almost completely resolved they have been focusing on her left upper extremity her left shoulder left-sided neck she is having similar radicular pain rating down her left arm.  She still having decreased use of her left shoulder decreased range of motion decree strength in the left shoulder secondary to neck and left shoulder pain.  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

## 2025-05-19 ENCOUNTER — APPOINTMENT (OUTPATIENT)
Dept: ORTHOPEDIC SURGERY | Facility: CLINIC | Age: 55
End: 2025-05-19
Payer: COMMERCIAL

## 2025-05-19 VITALS
SYSTOLIC BLOOD PRESSURE: 135 MMHG | BODY MASS INDEX: 30.1 KG/M2 | WEIGHT: 215 LBS | HEIGHT: 71 IN | HEART RATE: 80 BPM | DIASTOLIC BLOOD PRESSURE: 82 MMHG

## 2025-05-19 DIAGNOSIS — M25.561 PAIN IN RIGHT KNEE: ICD-10-CM

## 2025-05-19 PROCEDURE — 99213 OFFICE O/P EST LOW 20 MIN: CPT

## 2025-05-19 NOTE — PHYSICAL EXAM
[Normal] : Gait: normal [de-identified] : General: Awake, alert, no acute distress, Patient was cooperative and appropriate during the examination.  The patient is of normal weight for height and age.  Walks without an antalgic gait.   Right knee Examination: Physical examination of the knee demonstrates normal skin without signs of skin changes or abnormalities. No erythema, warmth, or joint effusion is appreciated.   Sensation is intact to light touch L2-S1 Palpable DP/PT pulse EHL/FHL/TA/GSC motor function intact   Range of Motion 0-130 with pain at the terminal degrees of flexion over anterior lateral knee   Strength Testing Quadriceps 5/5 Hamstring 5/5 Patient is able to perform a straight leg raise without difficulty.   Palpation Not tender over the anterior lateral tibial plateau and lateral femoral condyle. No palpable defect appreciated in the quadriceps or patellar tendons Not tender to palpation of medial joint line Mildly tender to palpation of lateral joint line Not tenderness at the medial patella facet Minimally tender palpation over the patellofemoral compartment Not  tender over the patella tendon Mildly tender palpation over the IT band distally   Special Tests Anterior Drawer negative Posterior Drawer negative Lachman Exam negative No Varus or Valgus Laxity at 0 or 30 degrees of knee flexion Susan's Test negative today Apley grind test Negative Active compression of the patella negative Lateral apprehension test negative Translation of the patella 2 quadrants with a firm endpoint Dial test negative  Right hip Examination: Physical examination of the hip demonstrates normal skin without signs of skin changes or abnormalities. No erythema, warmth, or joint effusion is appreciated.   Sensation is intact to light touch L2-S1 Palpable DP/PT pulse EHL/FHL/TA/GSC motor function intact   Range of Motion 130 degrees of flexion to full extension 30 degrees of internal rotation 70 degrees of external rotation 45 degrees of hip abduction 20 degrees of hip adduction   Strength Testing Hip Flexors/Hip Extensors 5/5 Hip Abductors/Hip Adductors 5/5 Quadriceps/Hamstring 5/5 Patient is able to perform a straight leg raise without difficulty.   Palpation Mildly tender to palpation about the greater trochanter Not tender to palpation about the hip joint Not tender to palpation about the pubic symphysis Not tender to palpation about the rectus and conjoint tendon insertions Not tender to palpation about the adductor longus tendon origin   Special Tests JEFRY test negative FADIR test negative Gant's test negative Tania test negative Resisted sit-ups negative Pain with valsalva negative Straight leg raise test negative     [de-identified] : MRI of the right hip from a [facility] on 10/21/2024 was reviewed with the patient today in the office: -demonstrates minimal trochanteric bursitis. Degenerative fraying of the anterior labrum without displaced tear.  MRI of the right knee from a [facility] on 10/21/2024 was reviewed the patient today in the office: -MRI of the right knee demonstrates edema at the anterior lateral tibial plateau near the insertion of the iliotibial band. Findings represent contusion versus sequela of iliotibial band syndrome. There is no fracture. -Low-grade sprain of the anterior cruciate ligament, without tear.  X-rays were obtained today the patient's pelvis, AP view, as well as an AP and lateral view of the right hip.  There is no evidence of acute fracture or dislocation.  Joint space appears well-maintained.  Patient does have evidence of pincer impingement with mild degenerative changes noted.  X-rays were also taken of the patient's right knee, AP, lateral, sunrise views.  No evidence of acute fracture or dislocation.  There is mild arthritic change of the medial and patellofemoral compartment.  There is an enthesophyte at the superior aspect of the patella.
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
## 2025-06-03 ENCOUNTER — APPOINTMENT (OUTPATIENT)
Dept: ORTHOPEDIC SURGERY | Facility: CLINIC | Age: 55
End: 2025-06-03
Payer: COMMERCIAL

## 2025-06-03 VITALS
WEIGHT: 215 LBS | HEART RATE: 78 BPM | HEIGHT: 71 IN | BODY MASS INDEX: 30.1 KG/M2 | SYSTOLIC BLOOD PRESSURE: 172 MMHG | DIASTOLIC BLOOD PRESSURE: 92 MMHG

## 2025-06-03 DIAGNOSIS — M47.22 OTHER SPONDYLOSIS WITH RADICULOPATHY, CERVICAL REGION: ICD-10-CM

## 2025-06-03 PROCEDURE — 99213 OFFICE O/P EST LOW 20 MIN: CPT

## 2025-06-03 NOTE — HISTORY OF PRESENT ILLNESS
[de-identified] : cc: Neck and bilateral arm pain  hpi: Ann is here today to discuss her neck and bilateral arm pain.  She is not doing with bilateral C6 radiculopathy bilateral shoulder issues she patient had MRI of her left shoulder.  She states the physical therapy is helping her symptoms she is scheduled to undergo a repeat injection in her neck and left shoulder this week.  She feels that she can return back to work within the next 2 to 3 weeks she is very happy with her current level of progress and pain control very happy with her current level function

## 2025-06-03 NOTE — ASSESSMENT
[FreeTextEntry1] : 54-year-old female with bilateral C6 cervical radiculopathy and left shoulder adhesive capsulitis  Her symptoms have improved I think they will continue to resolve I recommend continuing moving forward with the injection I believe this will help her current symptoms We will give her a note to head back to work at the end of June as she has had a full recovery of her symptoms she will reach out to me if she has any issues or she feels like she cannot return back to work

## 2025-06-03 NOTE — PHYSICAL EXAM
[de-identified] : MRI MRI of left shoulder performed in [facility] demonstrates no acute tendon tears no rotator cuff injury there is signs of subacute adhesive capsulitis

## 2025-06-30 ENCOUNTER — APPOINTMENT (OUTPATIENT)
Dept: ORTHOPEDIC SURGERY | Facility: CLINIC | Age: 55
End: 2025-06-30

## 2025-07-03 ENCOUNTER — APPOINTMENT (OUTPATIENT)
Dept: ORTHOPEDIC SURGERY | Facility: CLINIC | Age: 55
End: 2025-07-03

## 2025-09-18 ENCOUNTER — APPOINTMENT (OUTPATIENT)
Dept: ORTHOPEDIC SURGERY | Facility: CLINIC | Age: 55
End: 2025-09-18

## 2025-09-18 DIAGNOSIS — M25.551 PAIN IN RIGHT HIP: ICD-10-CM

## 2025-09-18 DIAGNOSIS — M25.561 PAIN IN RIGHT KNEE: ICD-10-CM
